# Patient Record
Sex: FEMALE | Race: ASIAN | NOT HISPANIC OR LATINO | Employment: FULL TIME | URBAN - METROPOLITAN AREA
[De-identification: names, ages, dates, MRNs, and addresses within clinical notes are randomized per-mention and may not be internally consistent; named-entity substitution may affect disease eponyms.]

---

## 2017-06-05 ENCOUNTER — OFFICE VISIT (OUTPATIENT)
Dept: INTERNAL MEDICINE | Facility: MEDICAL CENTER | Age: 32
End: 2017-06-05
Payer: MEDICAID

## 2017-06-05 VITALS
DIASTOLIC BLOOD PRESSURE: 70 MMHG | WEIGHT: 110 LBS | HEART RATE: 58 BPM | TEMPERATURE: 97 F | SYSTOLIC BLOOD PRESSURE: 108 MMHG | OXYGEN SATURATION: 99 % | HEIGHT: 60 IN | BODY MASS INDEX: 21.6 KG/M2 | RESPIRATION RATE: 18 BRPM

## 2017-06-05 DIAGNOSIS — R06.9 BREATHING PROBLEM: ICD-10-CM

## 2017-06-05 DIAGNOSIS — K21.9 GASTROESOPHAGEAL REFLUX DISEASE WITHOUT ESOPHAGITIS: ICD-10-CM

## 2017-06-05 DIAGNOSIS — D64.9 ANEMIA, UNSPECIFIED TYPE: ICD-10-CM

## 2017-06-05 DIAGNOSIS — E87.6 HYPOKALEMIA: ICD-10-CM

## 2017-06-05 DIAGNOSIS — Z29.9 PREVENTIVE MEASURE: ICD-10-CM

## 2017-06-05 PROCEDURE — 99203 OFFICE O/P NEW LOW 30 MIN: CPT | Mod: GC | Performed by: INTERNAL MEDICINE

## 2017-06-05 RX ORDER — ALBUTEROL SULFATE 90 UG/1
2 AEROSOL, METERED RESPIRATORY (INHALATION) EVERY 6 HOURS PRN
Qty: 8.5 G | Refills: 1 | Status: SHIPPED | OUTPATIENT
Start: 2017-06-05 | End: 2017-12-07

## 2017-06-05 ASSESSMENT — ENCOUNTER SYMPTOMS
EYE DISCHARGE: 0
WEIGHT LOSS: 0
TINGLING: 0
CHILLS: 0
EYE PAIN: 0
WHEEZING: 0
SHORTNESS OF BREATH: 0
BLURRED VISION: 0
TREMORS: 0
COUGH: 0
SPUTUM PRODUCTION: 0
PALPITATIONS: 0
FEVER: 0
SORE THROAT: 0
VOMITING: 0
ORTHOPNEA: 0
DEPRESSION: 0
ABDOMINAL PAIN: 0
NAUSEA: 0
DIARRHEA: 0
HEADACHES: 0
DIZZINESS: 0

## 2017-06-05 ASSESSMENT — LIFESTYLE VARIABLES: SUBSTANCE_ABUSE: 0

## 2017-06-05 ASSESSMENT — PAIN SCALES - GENERAL: PAINLEVEL: NO PAIN

## 2017-06-05 ASSESSMENT — PATIENT HEALTH QUESTIONNAIRE - PHQ9: CLINICAL INTERPRETATION OF PHQ2 SCORE: 0

## 2017-06-05 NOTE — PROGRESS NOTES
New Patient to Establish    Reason to establish: Difficulty  to take deep breath  For 5 week     CC:Difficult to take deep breath     HPI:   Pt came to the clinic to establish with us.    Pt states that she is having difficulty to take deep breath x 5 week. Never had this problem before. Denied dyspnea, pleuritic chest pain, fever, chills, cough, sore throat, nausea or vomiting. She does not smoke. Patient stated it usually get worse when she exercises.    Patient stated that she had a tubal ligation on 10/ 2016 , stated after the  procedure she had complication ( Bleed and abdominal pain ) so she was admitte to the hospital .    Patient had lab in 10 2016, lab showed normocytic anemia and hypokalemia .    Patient stated that she was diagnosed with GERD in past and was using medication for it. Patient is currently off from medication because her symptoms is well controlled without any medication. Patient stated she had an EGD done in the past due to the GERD and it came back normal .      Patient Active Problem List    Diagnosis Date Noted   • Nausea and vomiting 10/20/2016   • Abdominal pain 10/20/2016   • Encounter for tubal ligation 10/19/2016       Past Medical History   Diagnosis Date   • Anesthesia      postop n/v   • Acid reflux    • Cold      10-       No current outpatient prescriptions on file.     No current facility-administered medications for this visit.       Allergies as of 06/05/2017 - Ben as Reviewed 06/05/2017   Allergen Reaction Noted   • Crab Anaphylaxis 09/06/2016       Social History     Social History   • Marital Status:      Spouse Name: N/A   • Number of Children: N/A   • Years of Education: N/A     Occupational History   • Not on file.     Social History Main Topics   • Smoking status: Never Smoker    • Smokeless tobacco: Never Used   • Alcohol Use: No   • Drug Use: No   • Sexual Activity: Not on file     Other Topics Concern   • Not on file     Social History Narrative        No family history on file.    Past Surgical History   Procedure Laterality Date   • Endoscopy     • Tubal coagulation laparoscopic bilateral Bilateral 10/19/2016     Procedure: TUBAL COAGULATION LAPAROSCOPIC BILATERAL;  Surgeon: Lucia Clemons M.D.;  Location: SURGERY [facility];  Service:    • Intra uterine device removal  10/19/2016     Procedure: INTRA UTERINE DEVICE REMOVAL;  Surgeon: Lucia Clemons M.D.;  Location: SURGERY [facility];  Service:        ROS: As per HPI. Additional pertinent symptoms as noted below.    Review of Systems   Constitutional: Negative for fever, chills, weight loss and malaise/fatigue.   HENT: Negative for congestion and sore throat.    Eyes: Negative for blurred vision, pain and discharge.   Respiratory: Negative for cough, sputum production, shortness of breath and wheezing.    Cardiovascular: Negative for chest pain, palpitations, orthopnea and leg swelling.   Gastrointestinal: Negative for nausea, vomiting, abdominal pain and diarrhea.   Genitourinary: Negative for dysuria, urgency and frequency.   Skin: Negative for rash.   Neurological: Negative for dizziness, tingling, tremors and headaches.   Psychiatric/Behavioral: Negative for depression and substance abuse.       /70 mmHg  Pulse 58  Temp(Src) 36.1 °C (97 °F)  Resp 18  Ht 1.524 m (5')  Wt 49.896 kg (110 lb)  BMI 21.48 kg/m2  SpO2 99%  Breastfeeding? No    Physical Exam  General:  Alert and oriented, No apparent distress.    Eyes:. No scleral icterus.    Throat: Clear no erythema or exudates noted.    Neck: Supple.     Lungs: Clear to auscultation and percussion bilaterally.    Cardiovascular: Regular rate and rhythm. No murmurs, rubs or gallops.    Abdomen:  Benign. No rebound or guarding noted.    Extremities: No clubbing, cyanosis, edema.    Skin: Clear. No rash or suspicious skin lesions noted.      Assessment and Plan    1. Anemia, unspecified type  -H/H in 10/16 : 9.5/28.7 , MCV: 88. Patient states  that she was admitted to the hospital due to the complication of a tubal ligation that lead to the bleeding and abdominal pain.   -Currently denies any symptom of anemia.   -We'll repeat labs if anemia persists then we'll do a full workup of anemia.    2. Hypokalemia  -Lab in 10/16 showed potassium was 3.3  -We'll repeat CMP    3. Preventive measure  -Per patient she got a flu shot in 2016  -Per patient she got Pap smear in 2016 and was normal  -We'll check lipid panel      4. Difficulty to take deep breath  -Denied dyspnea, chest pain, cough, pleuritic chest pain, fever or chills  -Lung examination is normal, so holding x-ray of chest  -We'll start on albuterol as needed. If symptoms persist then we'll consider PFT or methacholine challenge test to rule out asthma.    5.GERD  -Denied any alarm symptoms on this visit  -Not on any medication  -Advised not to eat spicy food, chocolate or caffeine        Signed by: Jennifer Romano M.D.

## 2017-06-05 NOTE — MR AVS SNAPSHOT
Brannon Williamson   2017 9:30 AM   Office Visit   MRN: [id number]    Department:  [facility]   Dept Phone:  [phone]    Description:  Female : 1985   Provider:  Jennifer Romano M.D.           Reason for Visit     Breathing Problem times one month and 2 weeks      Allergies as of 2017     Allergen Noted Reactions    Crab 2016   Anaphylaxis      You were diagnosed with     Anemia, unspecified type   [7361206]       Hypokalemia   [[id number]]       Preventive measure   [354257]         Vital Signs     Blood Pressure Pulse Temperature Respirations Height Weight    108/70 mmHg 58 36.1 °C (97 °F) 18 1.524 m (5') 49.896 kg (110 lb)    Body Mass Index Oxygen Saturation Breastfeeding? Smoking Status          21.48 kg/m2 99% No Never Smoker         Basic Information     Date Of Birth Sex Race Ethnicity Preferred Language    1985 Female  Non- English      Problem List              ICD-10-CM Priority Class Noted - Resolved    Encounter for tubal ligation Z30.2   10/19/2016 - Present    Nausea and vomiting R11.2   10/20/2016 - Present    Abdominal pain R10.9   10/20/2016 - Present      Health Maintenance        Date Due Completion Dates    IMM DTaP/Tdap/Td Vaccine (1 - Tdap) 2004 ---    PAP SMEAR 2006 ---            Current Immunizations     Influenza Vaccine Quad Inj (Pf) 10/20/2016  8:40 PM    Tdap Vaccine 2010      Below and/or attached are the medications your provider expects you to take. Review all of your home medications and newly ordered medications with your provider and/or pharmacist. Follow medication instructions as directed by your provider and/or pharmacist. Please keep your medication list with you and share with your provider. Update the information when medications are discontinued, doses are changed, or new medications (including over-the-counter products) are added; and carry medication information at all times in the event of  emergency situations     Allergies:  CRAB - Anaphylaxis               Medications  Valid as of: June 05, 2017 -  9:59 AM    Generic Name Brand Name Tablet Size Instructions for use    .                 Medicines prescribed today were sent to:     [facility] [address] [place] [address] [place] - [address] [place] [address]    Phone: [phone] Fax: [phone]    Open 24 Hours?: No      Medication refill instructions:       If your prescription bottle indicates you have medication refills left, it is not necessary to call your provider’s office. Please contact your pharmacy and they will refill your medication.    If your prescription bottle indicates you do not have any refills left, you may request refills at any time through one of the following ways: The online [employer] system (except Urgent Care), by calling your provider’s office, or by asking your pharmacy to contact your provider’s office with a refill request. Medication refills are processed only during regular business hours and may not be available until the next business day. Your provider may request additional information or to have a follow-up visit with you prior to refilling your medication.   *Please Note: Medication refills are assigned a new Rx number when refilled electronically. Your pharmacy may indicate that no refills were authorized even though a new prescription for the same medication is available at the pharmacy. Please request the medicine by name with the pharmacy before contacting your provider for a refill.        Your To Do List     Future Labs/Procedures Complete By Expires    CBC WITH DIFFERENTIAL  As directed 6/5/2018    COMP METABOLIC PANEL  As directed 6/5/2018         [employer] Access Code: Activation code not generated  Current [employer] Status: Active

## 2017-07-17 DIAGNOSIS — E87.6 HYPOKALEMIA: ICD-10-CM

## 2017-07-17 DIAGNOSIS — D64.9 ANEMIA, UNSPECIFIED TYPE: ICD-10-CM

## 2017-12-07 ENCOUNTER — OFFICE VISIT (OUTPATIENT)
Dept: INTERNAL MEDICINE | Facility: MEDICAL CENTER | Age: 32
End: 2017-12-07
Payer: COMMERCIAL

## 2017-12-07 VITALS
HEIGHT: 61 IN | WEIGHT: 113.8 LBS | HEART RATE: 70 BPM | OXYGEN SATURATION: 99 % | SYSTOLIC BLOOD PRESSURE: 102 MMHG | TEMPERATURE: 97.5 F | BODY MASS INDEX: 21.49 KG/M2 | DIASTOLIC BLOOD PRESSURE: 78 MMHG

## 2017-12-07 DIAGNOSIS — R06.02 SHORTNESS OF BREATH: ICD-10-CM

## 2017-12-07 DIAGNOSIS — Z00.00 HEALTH CARE MAINTENANCE: ICD-10-CM

## 2017-12-07 DIAGNOSIS — K21.9 GASTROESOPHAGEAL REFLUX DISEASE WITHOUT ESOPHAGITIS: ICD-10-CM

## 2017-12-07 DIAGNOSIS — J02.9 SORE THROAT: ICD-10-CM

## 2017-12-07 LAB
INT CON NEG: NEGATIVE
INT CON POS: POSITIVE
S PYO AG THROAT QL: NORMAL

## 2017-12-07 PROCEDURE — 99214 OFFICE O/P EST MOD 30 MIN: CPT | Mod: GC | Performed by: INTERNAL MEDICINE

## 2017-12-07 PROCEDURE — 87880 STREP A ASSAY W/OPTIC: CPT | Performed by: INTERNAL MEDICINE

## 2017-12-07 RX ORDER — OMEPRAZOLE 20 MG/1
20 CAPSULE, DELAYED RELEASE ORAL DAILY
Qty: 60 CAP | Refills: 0 | Status: SHIPPED | OUTPATIENT
Start: 2017-12-07 | End: 2021-03-04

## 2017-12-07 RX ORDER — FAMOTIDINE 40 MG/1
40 TABLET, FILM COATED ORAL DAILY
Qty: 60 TAB | Refills: 0 | Status: SHIPPED | OUTPATIENT
Start: 2017-12-07 | End: 2021-03-04

## 2017-12-07 NOTE — PATIENT INSTRUCTIONS
Start taking omeprazole in the morning and pepcid at night.   Follow up with GI doctor.       Gastroesophageal Reflux Disease, Adult  Gastroesophageal reflux disease (GERD) happens when acid from your stomach flows up into the esophagus. When acid comes in contact with the esophagus, the acid causes soreness (inflammation) in the esophagus. Over time, GERD may create small holes (ulcers) in the lining of the esophagus.  CAUSES   · Increased body weight. This puts pressure on the stomach, making acid rise from the stomach into the esophagus.  · Smoking. This increases acid production in the stomach.  · Drinking alcohol. This causes decreased pressure in the lower esophageal sphincter (valve or ring of muscle between the esophagus and stomach), allowing acid from the stomach into the esophagus.  · Late evening meals and a full stomach. This increases pressure and acid production in the stomach.  · A malformed lower esophageal sphincter.  Sometimes, no cause is found.  SYMPTOMS   · Burning pain in the lower part of the mid-chest behind the breastbone and in the mid-stomach area. This may occur twice a week or more often.  · Trouble swallowing.  · Sore throat.  · Dry cough.  · Asthma-like symptoms including chest tightness, shortness of breath, or wheezing.  DIAGNOSIS   Your caregiver may be able to diagnose GERD based on your symptoms. In some cases, X-rays and other tests may be done to check for complications or to check the condition of your stomach and esophagus.  TREATMENT   Your caregiver may recommend over-the-counter or prescription medicines to help decrease acid production. Ask your caregiver before starting or adding any new medicines.   HOME CARE INSTRUCTIONS   · Change the factors that you can control. Ask your caregiver for guidance concerning weight loss, quitting smoking, and alcohol consumption.  · Avoid foods and drinks that make your symptoms worse, such as:  ¨ Caffeine or alcoholic  drinks.  ¨ Chocolate.  ¨ Peppermint or mint flavorings.  ¨ Garlic and onions.  ¨ Spicy foods.  ¨ Citrus fruits, such as oranges, ghulam, or limes.  ¨ Tomato-based foods such as sauce, chili, salsa, and pizza.  ¨ Fried and fatty foods.  · Avoid lying down for the 3 hours prior to your bedtime or prior to taking a nap.  · Eat small, frequent meals instead of large meals.  · Wear loose-fitting clothing. Do not wear anything tight around your waist that causes pressure on your stomach.  · Raise the head of your bed 6 to 8 inches with wood blocks to help you sleep. Extra pillows will not help.  · Only take over-the-counter or prescription medicines for pain, discomfort, or fever as directed by your caregiver.  · Do not take aspirin, ibuprofen, or other nonsteroidal anti-inflammatory drugs (NSAIDs).  SEEK IMMEDIATE MEDICAL CARE IF:   · You have pain in your arms, neck, jaw, teeth, or back.  · Your pain increases or changes in intensity or duration.  · You develop nausea, vomiting, or sweating (diaphoresis).  · You develop shortness of breath, or you faint.  · Your vomit is green, yellow, black, or looks like coffee grounds or blood.  · Your stool is red, bloody, or black.  These symptoms could be signs of other problems, such as heart disease, gastric bleeding, or esophageal bleeding.  MAKE SURE YOU:   · Understand these instructions.  · Will watch your condition.  · Will get help right away if you are not doing well or get worse.     This information is not intended to replace advice given to you by your health care provider. Make sure you discuss any questions you have with your health care provider.     Document Released: 09/27/2006 Document Revised: 01/08/2016 Document Reviewed: 04/13/2016  [employer] Interactive Patient Education ©2016 [employer]

## 2017-12-07 NOTE — PROGRESS NOTES
"      Established Patient    Brannon presents today with the following:    CC: epigastric pain     HPI: Ms Williamson is a 33 yo female presents today for epigastric pain started last Sunday, it is burning pain,worsens intermittently, specially with food with no radiation. She has a history of GERD, was on omeprazole until few months ago. She stopped taking it few months ago as her symptoms got better. She had 2 endoscopies done, last was about one year ago which was normal. She has an appointment with GI on January 12th. She avoids spicy food, chocolate because it worsens reflux. She has sore throat, no cold, fever, cough. She also has the sensation of unable to take a deep breath. Albuterol inhaler did not help. She denies loss of appetite, weight loss, melena, hematemesis.   She also complains of sensation of difficult to take a deep breath specially when exercising. Denies chest pain, cough, wheezing, night time waking with SOB. Previously prescribed albuterol did not help and she stopped using it.     Patient Active Problem List    Diagnosis Date Noted   • Breathing problem 06/05/2017   • Preventive measure 06/05/2017   • Hypokalemia 06/05/2017   • Anemia 06/05/2017   • Nausea and vomiting 10/20/2016   • Abdominal pain 10/20/2016   • Encounter for tubal ligation 10/19/2016       Current Outpatient Prescriptions   Medication Sig Dispense Refill   • famotidine (PEPCID) 40 MG Tab Take 1 Tab by mouth every day. At night 60 Tab 0   • omeprazole (PRILOSEC) 20 MG delayed-release capsule Take 1 Cap by mouth every day. In the morning 60 Cap 0     No current facility-administered medications for this visit.        ROS: As per HPI. Additional pertinent symptoms as noted below.        /78   Pulse 70   Temp 36.4 °C (97.5 °F)   Ht 1.543 m (5' 0.75\")   Wt 51.6 kg (113 lb 12.8 oz)   SpO2 99%   BMI 21.68 kg/m²     Physical Exam   Constitutional:  oriented to person, place, and time. No distress.   Eyes: Pupils are equal, " round, and reactive to light. No scleral icterus.  Neck: Neck supple. No thyromegaly present.   Cardiovascular: Normal rate, regular rhythm and normal heart sounds.  Exam reveals no gallop and no friction rub.  No murmur heard.  Pulmonary/Chest: Breath sounds normal. Chest wall is not dull to percussion.   Musculoskeletal:   no edema.   Lymphadenopathy: no cervical adenopathy  Neurological: alert and oriented to person, place, and time.   Skin: No cyanosis. Nails show no clubbing.  Throat: erythema,no exudates, no cervical LN     Note: I have reviewed all pertinent labs and diagnostic tests associated with this visit with specific comments listed under the assessment and plan below    Assessment and Plan  1. Gastroesophageal reflux disease without esophagitis  Recommended to take pepcid and omeprazole.   Avoid food that trigger GERD.   Follow up with GI     2. Sore throat  rapid Strep A - negative   Symptomatic treatment, can be due to reflux.     3. Health care maintenance  Flu- received  Pap smear - last done 2016  Reviewed and discussed labs from 07/2017 HB 13.7, normal FBS, lipid panel     4. Difficulty to take deep breath  -Denied dyspnea, chest pain, cough, pleuritic chest pain, fever or chills  Spirometry in office showed FEV1/FVC 0.9   Reassured the patient.    Followup: Return in about 10 weeks (around 2/15/2018).      Signed by: Yonas Reece M.D.

## 2017-12-12 DIAGNOSIS — R06.02 SHORTNESS OF BREATH: ICD-10-CM

## 2018-11-19 ENCOUNTER — OFFICE VISIT (OUTPATIENT)
Dept: INTERNAL MEDICINE | Facility: MEDICAL CENTER | Age: 33
End: 2018-11-19
Payer: COMMERCIAL

## 2018-11-19 VITALS
SYSTOLIC BLOOD PRESSURE: 111 MMHG | HEIGHT: 61 IN | BODY MASS INDEX: 22.96 KG/M2 | OXYGEN SATURATION: 99 % | HEART RATE: 64 BPM | DIASTOLIC BLOOD PRESSURE: 72 MMHG | TEMPERATURE: 98 F | WEIGHT: 121.6 LBS

## 2018-11-19 DIAGNOSIS — R06.9 BREATHING PROBLEM: ICD-10-CM

## 2018-11-19 DIAGNOSIS — Z23 ENCOUNTER FOR VACCINATION: ICD-10-CM

## 2018-11-19 DIAGNOSIS — L20.84 INTRINSIC ECZEMA: ICD-10-CM

## 2018-11-19 DIAGNOSIS — Z00.00 ANNUAL PHYSICAL EXAM: ICD-10-CM

## 2018-11-19 DIAGNOSIS — L60.0 INGROWN TOENAIL: ICD-10-CM

## 2018-11-19 DIAGNOSIS — K21.9 GASTROESOPHAGEAL REFLUX DISEASE WITHOUT ESOPHAGITIS: ICD-10-CM

## 2018-11-19 PROCEDURE — 90471 IMMUNIZATION ADMIN: CPT | Performed by: INTERNAL MEDICINE

## 2018-11-19 PROCEDURE — 90686 IIV4 VACC NO PRSV 0.5 ML IM: CPT | Performed by: INTERNAL MEDICINE

## 2018-11-19 PROCEDURE — 99395 PREV VISIT EST AGE 18-39: CPT | Mod: GC,25 | Performed by: INTERNAL MEDICINE

## 2018-11-19 RX ORDER — CLOBETASOL PROPIONATE 0.5 MG/G
OINTMENT TOPICAL
Qty: 1 TUBE | Refills: 0 | Status: SHIPPED | OUTPATIENT
Start: 2018-11-19 | End: 2021-03-04

## 2018-11-19 ASSESSMENT — PATIENT HEALTH QUESTIONNAIRE - PHQ9: CLINICAL INTERPRETATION OF PHQ2 SCORE: 0

## 2018-11-19 NOTE — PROGRESS NOTES
Established Patient    Brannon presents today with the following:    CC: annual exam, ingrown toe nail     HPI: Ms Williamson is a 31 yo female here today for annual visit.   She continues to have the sensation of needing to take a deep breath but unable to do it. It happens almost daily mostly when she is resting, denies shortness of breath on exertion, cough, pleuritic type chest pain.  She is able to exercise regularly without any issues.  It does not interfere her daily life much.   She takes PPI as needed basis for GERD, symptoms under control, has modified her diet which helps.  She has bilateral ingrown toenails on both big toes.  It irritates especially during the night, wakes her up from sleep.  requesting a referral to podiatry.  She has eczema since teenage years, flares up on and off, previously used clobetasol ointment and tacrolimus cream.   Non-smoker, no alcohol use.  Sexually active, .  had tubal ligation done.   Pap smear scheduled for January.       Patient Active Problem List    Diagnosis Date Noted   • Annual physical exam 11/19/2018   • Ingrown toenail 11/19/2018   • Gastroesophageal reflux disease without esophagitis 12/07/2017   • Health care maintenance 12/07/2017   • Breathing problem 06/05/2017   • Preventive measure 06/05/2017   • Hypokalemia 06/05/2017   • Anemia 06/05/2017   • Nausea and vomiting 10/20/2016   • Abdominal pain 10/20/2016   • Encounter for tubal ligation 10/19/2016       Current Outpatient Prescriptions   Medication Sig Dispense Refill   • clobetasol (TEMOVATE) 0.05 % Ointment Apply as a thin layer to affected area once daily 1 Tube 0   • famotidine (PEPCID) 40 MG Tab Take 1 Tab by mouth every day. At night 60 Tab 0   • omeprazole (PRILOSEC) 20 MG delayed-release capsule Take 1 Cap by mouth every day. In the morning 60 Cap 0     No current facility-administered medications for this visit.        ROS: As per HPI. Additional pertinent systems as noted below.  BP  "111/72 (BP Location: Right arm, Patient Position: Sitting)   Pulse 64   Temp 36.7 °C (98 °F) (Temporal)   Ht 1.543 m (5' 0.75\")   Wt 55.2 kg (121 lb 9.6 oz)   SpO2 99%   BMI 23.17 kg/m²     Physical Exam   Constitutional:  oriented to person, place, and time. No distress.   Eyes: Pupils are equal, round, and reactive to light. No scleral icterus.  Neck: Neck supple. No thyromegaly present.   Cardiovascular: Normal rate, regular rhythm and normal heart sounds.  Exam reveals no gallop and no friction rub.  No murmur heard.  Pulmonary/Chest: Breath sounds normal. Chest wall is not dull to percussion.   Musculoskeletal:   no edema.   Lymphadenopathy: no cervical adenopathy  Neurological: alert and oriented to person, place, and time.   Skin: No cyanosis. Nails show no clubbing.  Ingrown toenail of bilateral big toes.     Note: I have reviewed all pertinent labs and diagnostic tests associated with this visit with specific comments listed under the assessment and plan below    Assessment and Plan    1. Breathing problem  Previously had spirometry done in the office which was normal.   Discussed at length that it is normal to feel the need to take a deep breath on and off especially when you are distracted/poor posture in the body is trying to expand the lungs.  This does not limit her activities and happens infrequently.   There are no other symptoms to suggest to do pulmonary function test.   Reassured and will continue to monitor.  Patient verbalized understanding.    2. Gastroesophageal reflux disease without esophagitis  Continue PPI    3. Annual physical exam  We will get basic lab work.  CBC CMP.  Had lipid panel done last year.    4. Encounter for vaccination  Flu shot given today.     5. Ingrown toenail  Discussed avoiding tight shoes, then the nails grow a little longer.   Referred to podiatry.       Followup: Return in about 1 year (around 11/19/2019).      Signed by: Yonas Reece M.D.  "

## 2018-11-19 NOTE — NON-PROVIDER
"Brannon Williamson is a 32 y.o. female here for a non-provider visit for:   FLU    Reason for immunization: Annual Flu Vaccine  Immunization records indicate need for vaccine: Yes, confirmed with Epic  Minimum interval has been met for this vaccine: Yes  ABN completed: Not Indicated    Order and dose verified by: Dionne RUBY Dated  08/07/15 was given to patient: Yes  All IAC Questionnaire questions were answered \"No.\"    Patient tolerated injection and no adverse effects were observed or reported: Yes    Pt scheduled for next dose in series: Not Indicated  "

## 2018-11-19 NOTE — PROGRESS NOTES
Established Patient    Brannon presents today with the following:    CC: ***    HPI: ***    Patient Active Problem List    Diagnosis Date Noted   • Gastroesophageal reflux disease without esophagitis 12/07/2017   • Health care maintenance 12/07/2017   • Breathing problem 06/05/2017   • Preventive measure 06/05/2017   • Hypokalemia 06/05/2017   • Anemia 06/05/2017   • Nausea and vomiting 10/20/2016   • Abdominal pain 10/20/2016   • Encounter for tubal ligation 10/19/2016       Current Outpatient Prescriptions   Medication Sig Dispense Refill   • famotidine (PEPCID) 40 MG Tab Take 1 Tab by mouth every day. At night 60 Tab 0   • omeprazole (PRILOSEC) 20 MG delayed-release capsule Take 1 Cap by mouth every day. In the morning 60 Cap 0     No current facility-administered medications for this visit.        ROS: As per HPI. Additional pertinent systems as noted below.    {ROS List:[id number]}    There were no vitals taken for this visit.           Physical Exam   Constitutional:  oriented to person, place, and time. No distress.   Eyes: Pupils are equal, round, and reactive to light. No scleral icterus.  Neck: Neck supple. No thyromegaly present.   Cardiovascular: Normal rate, regular rhythm and normal heart sounds.  Exam reveals no gallop and no friction rub.  No murmur heard.  Pulmonary/Chest: Breath sounds normal. Chest wall is not dull to percussion.   Musculoskeletal:   no edema.   Lymphadenopathy: no cervical adenopathy  Neurological: alert and oriented to person, place, and time.   Skin: No cyanosis. Nails show no clubbing.  Other: ***    Note: I have reviewed all pertinent labs and diagnostic tests associated with this visit with specific comments listed under the assessment and plan below    Assessment and Plan    There are no diagnoses linked to this encounter.    Followup: No Follow-up on file.      Signed by: Yonas Reece M.D.

## 2019-04-02 ENCOUNTER — HOSPITAL ENCOUNTER (OUTPATIENT)
Dept: LAB | Facility: MEDICAL CENTER | Age: 34
End: 2019-04-02
Attending: INTERNAL MEDICINE
Payer: COMMERCIAL

## 2019-04-02 DIAGNOSIS — Z00.00 ANNUAL PHYSICAL EXAM: ICD-10-CM

## 2019-04-02 LAB
ALBUMIN SERPL BCP-MCNC: 3.9 G/DL (ref 3.2–4.9)
ALBUMIN/GLOB SERPL: 1.3 G/DL
ALP SERPL-CCNC: 69 U/L (ref 30–99)
ALT SERPL-CCNC: 19 U/L (ref 2–50)
ANION GAP SERPL CALC-SCNC: 8 MMOL/L (ref 0–11.9)
AST SERPL-CCNC: 21 U/L (ref 12–45)
BASOPHILS # BLD AUTO: 0.3 % (ref 0–1.8)
BASOPHILS # BLD: 0.02 K/UL (ref 0–0.12)
BILIRUB SERPL-MCNC: 0.6 MG/DL (ref 0.1–1.5)
BUN SERPL-MCNC: 11 MG/DL (ref 8–22)
CALCIUM SERPL-MCNC: 8.4 MG/DL (ref 8.5–10.5)
CHLORIDE SERPL-SCNC: 104 MMOL/L (ref 96–112)
CO2 SERPL-SCNC: 27 MMOL/L (ref 20–33)
CREAT SERPL-MCNC: 0.72 MG/DL (ref 0.5–1.4)
EOSINOPHIL # BLD AUTO: 0.17 K/UL (ref 0–0.51)
EOSINOPHIL NFR BLD: 2.4 % (ref 0–6.9)
ERYTHROCYTE [DISTWIDTH] IN BLOOD BY AUTOMATED COUNT: 40.9 FL (ref 35.9–50)
GLOBULIN SER CALC-MCNC: 3 G/DL (ref 1.9–3.5)
GLUCOSE SERPL-MCNC: 89 MG/DL (ref 65–99)
HCT VFR BLD AUTO: 42.6 % (ref 37–47)
HGB BLD-MCNC: 13.8 G/DL (ref 12–16)
IMM GRANULOCYTES # BLD AUTO: 0.02 K/UL (ref 0–0.11)
IMM GRANULOCYTES NFR BLD AUTO: 0.3 % (ref 0–0.9)
LYMPHOCYTES # BLD AUTO: 1.03 K/UL (ref 1–4.8)
LYMPHOCYTES NFR BLD: 14.7 % (ref 22–41)
MCH RBC QN AUTO: 29.4 PG (ref 27–33)
MCHC RBC AUTO-ENTMCNC: 32.4 G/DL (ref 33.6–35)
MCV RBC AUTO: 90.8 FL (ref 81.4–97.8)
MONOCYTES # BLD AUTO: 0.52 K/UL (ref 0–0.85)
MONOCYTES NFR BLD AUTO: 7.4 % (ref 0–13.4)
NEUTROPHILS # BLD AUTO: 5.26 K/UL (ref 2–7.15)
NEUTROPHILS NFR BLD: 74.9 % (ref 44–72)
NRBC # BLD AUTO: 0 K/UL
NRBC BLD-RTO: 0 /100 WBC
PLATELET # BLD AUTO: 212 K/UL (ref 164–446)
PMV BLD AUTO: 9.8 FL (ref 9–12.9)
POTASSIUM SERPL-SCNC: 3.3 MMOL/L (ref 3.6–5.5)
PROT SERPL-MCNC: 6.9 G/DL (ref 6–8.2)
RBC # BLD AUTO: 4.69 M/UL (ref 4.2–5.4)
SODIUM SERPL-SCNC: 139 MMOL/L (ref 135–145)
WBC # BLD AUTO: 7 K/UL (ref 4.8–10.8)

## 2019-04-02 PROCEDURE — 80053 COMPREHEN METABOLIC PANEL: CPT

## 2019-04-02 PROCEDURE — 85025 COMPLETE CBC W/AUTO DIFF WBC: CPT

## 2019-04-02 PROCEDURE — 36415 COLL VENOUS BLD VENIPUNCTURE: CPT

## 2021-02-28 ENCOUNTER — OFFICE VISIT (OUTPATIENT)
Dept: URGENT CARE | Facility: PHYSICIAN GROUP | Age: 36
End: 2021-02-28
Payer: COMMERCIAL

## 2021-02-28 ENCOUNTER — APPOINTMENT (OUTPATIENT)
Dept: RADIOLOGY | Facility: IMAGING CENTER | Age: 36
End: 2021-02-28
Attending: NURSE PRACTITIONER
Payer: COMMERCIAL

## 2021-02-28 VITALS
SYSTOLIC BLOOD PRESSURE: 110 MMHG | WEIGHT: 117 LBS | TEMPERATURE: 98.5 F | HEIGHT: 60 IN | RESPIRATION RATE: 16 BRPM | BODY MASS INDEX: 22.97 KG/M2 | HEART RATE: 65 BPM | OXYGEN SATURATION: 99 % | DIASTOLIC BLOOD PRESSURE: 70 MMHG

## 2021-02-28 DIAGNOSIS — M25.561 ACUTE PAIN OF RIGHT KNEE: ICD-10-CM

## 2021-02-28 DIAGNOSIS — S89.91XA INJURY OF RIGHT KNEE, INITIAL ENCOUNTER: ICD-10-CM

## 2021-02-28 DIAGNOSIS — S86.911A KNEE STRAIN, RIGHT, INITIAL ENCOUNTER: ICD-10-CM

## 2021-02-28 PROCEDURE — 73564 X-RAY EXAM KNEE 4 OR MORE: CPT | Mod: TC,RT | Performed by: NURSE PRACTITIONER

## 2021-02-28 PROCEDURE — 99203 OFFICE O/P NEW LOW 30 MIN: CPT | Performed by: NURSE PRACTITIONER

## 2021-02-28 ASSESSMENT — VISUAL ACUITY: OU: 1

## 2021-02-28 ASSESSMENT — FIBROSIS 4 INDEX: FIB4 SCORE: 0.8

## 2021-02-28 ASSESSMENT — ENCOUNTER SYMPTOMS
CONSTITUTIONAL NEGATIVE: 1
NEUROLOGICAL NEGATIVE: 1

## 2021-02-28 NOTE — PATIENT INSTRUCTIONS
A referral request has been placed for sports medicine. We have a referrals department that is tasked with locating a suitable office that currently accepts patients and your insurance and you should be receiving referral information from them such as the office name, address, and number. This process usually takes around 3 business days. If you are not contacted by our referrals department, please call [phone].         Knee Sprain, Adult    A knee sprain is a stretch or tear in a knee ligament. Knee ligaments are bands of tissue that connect bones in the knee to each other.  What are the causes?  This condition often results from:  · A fall.  · An injury to the knee.  What are the signs or symptoms?  Symptoms of this condition include:  · Trouble bending the leg.  · Swelling in the knee.  · Bruising around the knee.  · Tenderness or pain in the knee.  · Muscle spasms around the knee.  How is this diagnosed?  This condition may be diagnosed based on:  · A physical exam.  · What happened just before you started to have symptoms.  · Tests, including:  ? An X-ray. This may be done to make sure no bones are broken.  ? An MRI. This may be done to check if the ligament is torn.  ? Stress testing of the knee. This may be done to check ligament damage.  How is this treated?  Treatment for this condition may involve:  · Keeping the knee still (immobilized) with a cast, brace, or splint.  · Applying ice to the knee. This helps with pain and swelling.  · Keeping the knee raised (elevated) above the level of your heart when you are resting. This helps with pain and swelling.  · Taking medicine for pain.  · Exercises to prevent or limit permanent weakness or stiffness in your knee.  · Surgery to reconnect the ligament to the bone or to reconstruct it. This may be needed if the ligament tore all the way.  Follow these instructions at home:  If you have a splint or brace:  · Wear the splint or brace as told by your health  care provider. Remove it only as told by your health care provider.  · Loosen the splint or brace if your toes tingle, become numb, or turn cold and blue.  · Keep the splint or brace clean.  · If the splint or brace is not waterproof:  ? Do not let it get wet.  ? Cover it with a watertight covering when you take a bath or a shower.  If you have a cast:  · Do not stick anything inside the cast to scratch your skin. Doing that increases your risk of infection.  · Check the skin around the cast every day. Tell your health care provider about any concerns.  · You may put lotion on dry skin around the edges of the cast. Do not put lotion on the skin underneath the cast.  · Keep the cast clean.  · If the cast is not waterproof:  ? Do not let it get wet.  ? Cover it with a watertight covering when you take a bath or a shower.  Managing pain, stiffness, and swelling    · If directed, put ice on the injured area.  ? If you have a removable splint or brace, remove it as told by your health care provider.  ? Put ice in a plastic bag.  ? Place a towel between your skin and the bag or between your cast and the bag.  ? Leave the ice on for 20 minutes, 2-3 times a day.  · Gently move your toes often to avoid stiffness and to lessen swelling.  · Elevate the injured area above the level of your heart while you are sitting or lying down.  · Take over-the-counter and prescription medicines only as told by your health care provider.  General instructions  · Do exercises as told by your health care provider.  · Keep all follow-up visits as told by your health care provider. This is important.  Contact a health care provider if:  · You have pain that gets worse.  · The cast, brace, or splint does not fit right.  · The cast, brace, or splint gets damaged.  Get help right away if:  · You cannot use your injured joint to support any of your body weight (cannot bear weight).  · You cannot move the injured joint.  · You cannot walk more than  a few steps without pain or without your knee buckling.  · You have significant pain, swelling, or numbness below the cast, brace, or splint.  This information is not intended to replace advice given to you by your health care provider. Make sure you discuss any questions you have with your health care provider.  Document Released: 12/18/2006 Document Revised: 04/10/2020 Document Reviewed: 07/07/2017  Elsevier Patient Education © 2020 [employer]        Meniscus Tear    A meniscus tear is a knee injury that happens when a piece of the meniscus is torn. The meniscus is a thick, rubbery, wedge-shaped cartilage in the knee. Two menisci are located in each knee. They sit between the upper bone (femur) and lower bone (tibia) that make up the knee joint. Each meniscus acts as a shock absorber for the knee.  A torn meniscus is one of the most common types of knee injuries. This injury can range from mild to severe. Surgery may be needed to repair a severe tear.  What are the causes?  This condition may be caused by any kneeling, squatting, twisting, or pivoting movement. Sports-related injuries are the most common cause. These often occur from:  · Running and stopping suddenly.  ? Changing direction.  ? Being tackled or knocked off your feet.  · Lifting or carrying heavy weights.  As people get older, their menisci get thinner and weaker. In these people, tears can happen more easily, such as from climbing stairs.  What increases the risk?  You are more likely to develop this condition if you:  · Play contact sports.  · Have a job that requires kneeling or squatting.  · Are male.  · Are over 40 years old.  What are the signs or symptoms?  Symptoms of this condition include:  · Knee pain, especially at the side of the knee joint. You may feel pain when the injury occurs, or you may only hear a pop and feel pain later.  · A feeling that your knee is clicking, catching, locking, or giving way.  · Not being able to fully  bend or extend your knee.  · Bruising or swelling in your knee.  How is this diagnosed?  This condition may be diagnosed based on your symptoms and a physical exam.  You may also have tests, such as:  · X-rays.  · MRI.  · A procedure to look inside your knee with a narrow surgical telescope (arthroscopy).  You may be referred to a knee specialist (orthopedic surgeon).  How is this treated?  Treatment for this injury depends on the severity of the tear. Treatment for a mild tear may include:  · Rest.  · Medicine to reduce pain and swelling. This is usually a nonsteroidal anti-inflammatory drug (NSAID), like ibuprofen.  · A knee brace, sleeve, or wrap.  · Using crutches or a walker to keep weight off your knee and to help you walk.  · Exercises to strengthen your knee (physical therapy).  You may need surgery if you have a severe tear or if other treatments are not working.  Follow these instructions at home:  If you have a brace, sleeve, or wrap:  · Wear it as told by your health care provider. Remove it only as told by your health care provider.  · Loosen the brace, sleeve, or wrap if your toes tingle, become numb, or turn cold and blue.  · Keep the brace, sleeve, or wrap clean and dry.  · If the brace, sleeve, or wrap is not waterproof:  ? Do not let it get wet.  ? Cover it with a watertight covering when you take a bath or shower.  Managing pain and swelling    · Take over-the-counter and prescription medicines only as told by your health care provider.  · If directed, put ice on your knee:  ? If you have a removable brace, sleeve, or wrap, remove it as told by your health care provider.  ? Put ice in a plastic bag.  ? Place a towel between your skin and the bag.  ? Leave the ice on for 20 minutes, 2-3 times per day.  · Move your toes often to avoid stiffness and to lessen swelling.  · Raise (elevate) the injured area above the level of your heart while you are sitting or lying down.  Activity  · Do not use the  injured limb to support your body weight until your health care provider says that you can. Use crutches or a walker as told by your health care provider.  · Return to your normal activities as told by your health care provider. Ask your health care provider what activities are safe for you.  · Perform range-of-motion exercises only as told by your health care provider.  · Begin doing exercises to strengthen your knee and leg muscles only as told by your health care provider. After you recover, your health care provider may recommend these exercises to help prevent another injury.  General instructions  · Use a knee brace, sleeve, or wrap as told by your health care provider.  · Ask your health care provider when it is safe to drive if you have a brace, sleeve, or wrap on your knee.  · Do not use any products that contain nicotine or tobacco, such as cigarettes, e-cigarettes, and chewing tobacco. If you need help quitting, ask your health care provider.  · Ask your health care provider if the medicine prescribed to you:  ? Requires you to avoid driving or using heavy machinery.  ? Can cause constipation. You may need to take these actions to prevent or treat constipation:  § Drink enough fluid to keep your urine pale yellow.  § Take over-the-counter or prescription medicines.  § Eat foods that are high in fiber, such as beans, whole grains, and fresh fruits and vegetables.  § Limit foods that are high in fat and processed sugars, such as fried or sweet foods.  · Keep all follow-up visits as told by your health care provider. This is important.  Contact a health care provider if:  · You have a fever.  · Your knee becomes red, tender, or swollen.  · Your pain medicine is not helping.  · Your symptoms get worse or do not improve after 2 weeks of home care.  Summary  · A meniscus tear is a knee injury that happens when a piece of the meniscus is torn.  · Treatment for this injury depends on the severity of the tear. You  may need surgery if you have a severe tear or if other treatments are not working.  · Rest, ice, and raise (elevate) your injured knee as told by your health care provider. This will help lessen pain and swelling.  · Contact a health care provider if you have new symptoms, or your symptoms get worse or do not improve after 2 weeks of home care.  · Keep all follow-up visits as told by your health care provider. This is important.  This information is not intended to replace advice given to you by your health care provider. Make sure you discuss any questions you have with your health care provider.  Document Released: 03/09/2004 Document Revised: 07/02/2019 Document Reviewed: 07/02/2019  Elsevier Patient Education © 2020 Elsevier Inc.

## 2021-02-28 NOTE — PROGRESS NOTES
Subjective:     Brannon Williamson is a 35 y.o. female who presents for Knee Injury (R knee pain, swelling, popped, constant pain, painful to put weight on, x2 days )       Knee Injury  This is a new problem. Episode onset: 2 days ago. The problem has been gradually worsening. Pertinent negatives include no rash. She has tried nothing for the symptoms.     Patient reports that 2 days ago she was doing a high knees workout circling a tire when she felt and heard a pop in her right knee. Felt immediate pain. Getting worse. Has tenderness along the anteromedial aspect of the right knee. Pain worsens with palpation and range of motion. Weight-bearing and ambulation makes the pain worse. Has a difficult time squatting and transitioning to/from sitting due to the pain. Feels like her whole right leg is stiff.    Patient was screened prior to rooming and denied COVID-19 diagnosis or contact with a person who has been diagnosed or is suspected to have COVID-19. During this visit, appropriate PPE was worn, hand hygiene was performed, and the patient and any visitors were masked.     PMH:  has a past medical history of Acid reflux, Anesthesia, and Cold.    MEDS:   Current Outpatient Medications:   •  clobetasol (TEMOVATE) 0.05 % Ointment, Apply as a thin layer to affected area once daily (Patient not taking: Reported on 2/28/2021), Disp: 1 Tube, Rfl: 0  •  famotidine (PEPCID) 40 MG Tab, Take 1 Tab by mouth every day. At night (Patient not taking: Reported on 2/28/2021), Disp: 60 Tab, Rfl: 0  •  omeprazole (PRILOSEC) 20 MG delayed-release capsule, Take 1 Cap by mouth every day. In the morning (Patient not taking: Reported on 2/28/2021), Disp: 60 Cap, Rfl: 0    ALLERGIES:   Allergies   Allergen Reactions   • Crab Anaphylaxis     SURGHX:   Past Surgical History:   Procedure Laterality Date   • TUBAL COAGULATION LAPAROSCOPIC BILATERAL Bilateral 10/19/2016    Procedure: TUBAL COAGULATION LAPAROSCOPIC BILATERAL;  Surgeon: Lucia  DIANA Clemons;  Location: SURGERY [facility];  Service:    • INTRA UTERINE DEVICE REMOVAL  10/19/2016    Procedure: INTRA UTERINE DEVICE REMOVAL;  Surgeon: Lucia Clemons M.D.;  Location: SURGERY [facility];  Service:    • ENDOSCOPY       SOCHX:  reports that she has never smoked. She has never used smokeless tobacco. She reports that she does not drink alcohol and does not use drugs.     FH: Reviewed with patient, not pertinent to this visit.    Review of Systems   Constitutional: Negative.    Musculoskeletal:        R knee injury, pain   Skin: Negative for rash.   Neurological: Negative.    All other systems reviewed and are negative.    Additional details per HPI.      Objective:     /70 (BP Location: Right arm, Patient Position: Sitting, BP Cuff Size: Adult)   Pulse 65   Temp 36.9 °C (98.5 °F) (Temporal)   Resp 16   Ht 1.524 m (5')   Wt 53.1 kg (117 lb)   SpO2 99%   BMI 22.85 kg/m²     Physical Exam  Vitals reviewed.   Constitutional:       General: She is not in acute distress.     Appearance: She is well-developed. She is not ill-appearing or toxic-appearing.   HENT:      Head: Normocephalic.      Right Ear: External ear normal.      Left Ear: External ear normal.   Eyes:      General: Vision grossly intact.      Extraocular Movements: Extraocular movements intact.      Conjunctiva/sclera: Conjunctivae normal.   Cardiovascular:      Rate and Rhythm: Normal rate.      Pulses: Normal pulses.   Pulmonary:      Effort: Pulmonary effort is normal. No respiratory distress.   Musculoskeletal:         General: No deformity.      Cervical back: Normal range of motion.      Right knee: Swelling present. No deformity, erythema, ecchymosis or lacerations. Decreased range of motion. Tenderness (Anteromedial and anterolateral joint lines) present. No LCL laxity, MCL laxity, ACL laxity or PCL laxity. Normal patellar mobility.      Instability Tests: Negative medial Adrienne test and negative lateral Adrienne  test.   Skin:     General: Skin is warm and dry.      Coloration: Skin is not pale.   Neurological:      Mental Status: She is alert and oriented to person, place, and time.      Sensory: No sensory deficit.      Motor: No weakness.      Coordination: Coordination normal.      Gait: Gait abnormal.   Psychiatric:         Behavior: Behavior normal. Behavior is cooperative.     X-ray of right knee:    Details    Reading Physician Reading Date Result Priority   Martha Carlos M.D.  [phone] 2/28/2021       Narrative & Impression        2/28/2021 1:31 PM     HISTORY/REASON FOR EXAM:  Pain/Deformity Following Trauma    TECHNIQUE/EXAM DESCRIPTION AND NUMBER OF VIEWS:  4 views of the RIGHT knee.     COMPARISON: None     FINDINGS:  There is no evidence of fracture or dislocation. Alignment is maintained.  No suprapatellar joint effusion is seen.  No soft tissue swelling is noted.    IMPRESSION:     No evidence of acute fracture or dislocation.             Last Resulted: 02/28/21  1:55 PM        Radiology report and images reviewed by myself. Concur with findings.      Assessment/Plan:     1. Injury of right knee, initial encounter  - DX-KNEE COMPLETE 4+ RIGHT; Future  - REFERRAL TO SPORTS MEDICINE    2. Acute pain of right knee  - DX-KNEE COMPLETE 4+ RIGHT; Future  - REFERRAL TO SPORTS MEDICINE    3. Knee strain, right, initial encounter  - REFERRAL TO SPORTS MEDICINE    Discussed rest, ice, compression, and elevation (RICE) and over-the-counter acetaminophen, per 's instructions, as needed for pain.  Patient reports she is unable to take NSAIDs due to her history of GERD.  Rx for pain medication declined.  Elastic bandage applied to the right knee.  Knee brace applied.  Likely strain, but patient should follow up with sports medicine especially if symptoms do not improve within a week. Referral placed for sports medicine follow-up, evaluation, and treatment.  Work note provided.    Differential  diagnosis, natural history, supportive care, over-the-counter symptom management per 's instructions, close monitoring, and indications for immediate follow-up discussed.     Patient advised to: Return for 1) Symptoms that worsen/don't improve, or go to ER, 2) Follow up with primary care in 7-10 days.    All questions answered. Patient agrees with the plan of care.    Discharge summary provided.    Billing note: patient billed as a new patient as the patient has not received any professional medical services from myself or another provider of the same specialty (family medicine) who belongs to the same group practice within the previous 3 years.

## 2021-02-28 NOTE — LETTER
February 28, 2021         Patient: Brannon Williamson   YOB: 1985   Date of Visit: 2/28/2021           To Whom it May Concern:    Brannon Williamson was seen in my clinic on 2/28/2021 due to right knee injury and pain. Due to medical necessity, please excuse patient from work for up to the next 3 days as needed.     If you have any questions or concerns, please don't hesitate to call.        Sincerely,           KILEY Ba.  Electronically Signed

## 2021-03-04 ENCOUNTER — OFFICE VISIT (OUTPATIENT)
Dept: URGENT CARE | Facility: PHYSICIAN GROUP | Age: 36
End: 2021-03-04
Payer: COMMERCIAL

## 2021-03-04 ENCOUNTER — TELEPHONE (OUTPATIENT)
Dept: SCHEDULING | Facility: IMAGING CENTER | Age: 36
End: 2021-03-04

## 2021-03-04 VITALS
OXYGEN SATURATION: 98 % | HEIGHT: 60 IN | TEMPERATURE: 98.2 F | HEART RATE: 72 BPM | DIASTOLIC BLOOD PRESSURE: 62 MMHG | WEIGHT: 117 LBS | SYSTOLIC BLOOD PRESSURE: 118 MMHG | BODY MASS INDEX: 22.97 KG/M2 | RESPIRATION RATE: 16 BRPM

## 2021-03-04 DIAGNOSIS — S83.91XA SPRAIN OF RIGHT KNEE, UNSPECIFIED LIGAMENT, INITIAL ENCOUNTER: ICD-10-CM

## 2021-03-04 DIAGNOSIS — M25.461 EFFUSION OF RIGHT KNEE: ICD-10-CM

## 2021-03-04 PROCEDURE — 99214 OFFICE O/P EST MOD 30 MIN: CPT | Performed by: PHYSICIAN ASSISTANT

## 2021-03-04 ASSESSMENT — ENCOUNTER SYMPTOMS: MYALGIAS: 0

## 2021-03-04 ASSESSMENT — FIBROSIS 4 INDEX: FIB4 SCORE: 0.8

## 2021-03-04 ASSESSMENT — PAIN SCALES - GENERAL: PAINLEVEL: 4=SLIGHT-MODERATE PAIN

## 2021-03-04 NOTE — LETTER
March 4, 2021    To Whom It May Concern:         This is confirmation that Brannon Williamson attended her scheduled appointment with Charles Wagner P.A.-C. on 3/04/21. She may return to work, but I recommend work restrictions. I recommend that she be on her feet for no longer than 4 hrs cumulatively. Please allow her to rest when needed.         If you have any questions please do not hesitate to call me at the phone number listed below.    Sincerely,          Charles Wagner P.A.-C.  [phone]

## 2021-03-05 NOTE — PROGRESS NOTES
Subjective:   Brannon Williamson is a 35 y.o. female who presents for Knee Pain (accident happened on Saturday when patient was lifting at the gym. Pt states it is still swollen and can some weight baring. )        Patient presents for reevaluation of knee pain.  Incident happened last weekend and she was subsequently evaluated in urgent care.  Imaging was normal and exam was normal.  Patient was referred to sports med for further evaluation.  Patient continues to have pain numbness predominantly lateral.  She states that the knee also feels unstable and looks swollen.  Pain worse with weightbearing and increased activity.  She attempted to go to work yesterday, but states her employer turned her away.  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  She is not having any distal numbness or tingling.  She has been icing and elevating, but has not taken NSAIDs, as these cause her GERD.     Review of Systems   Musculoskeletal: Positive for joint pain. Negative for myalgias.       PMH:  has a past medical history of Acid reflux, Anesthesia, and Cold.  MEDS:   Current Outpatient Medications:   •  clobetasol (TEMOVATE) 0.05 % Ointment, Apply as a thin layer to affected area once daily (Patient not taking: Reported on 2/28/2021), Disp: 1 Tube, Rfl: 0  •  famotidine (PEPCID) 40 MG Tab, Take 1 Tab by mouth every day. At night (Patient not taking: Reported on 2/28/2021), Disp: 60 Tab, Rfl: 0  •  omeprazole (PRILOSEC) 20 MG delayed-release capsule, Take 1 Cap by mouth every day. In the morning (Patient not taking: Reported on 2/28/2021), Disp: 60 Cap, Rfl: 0  ALLERGIES:   Allergies   Allergen Reactions   • Crab Anaphylaxis     SURGHX:   Past Surgical History:   Procedure Laterality Date   • TUBAL COAGULATION LAPAROSCOPIC BILATERAL Bilateral 10/19/2016    Procedure: TUBAL COAGULATION LAPAROSCOPIC BILATERAL;  Surgeon: Lucia Clemons M.D.;  Location: SURGERY [facility];  Service:    • INTRA  UTERINE DEVICE REMOVAL  10/19/2016    Procedure: INTRA UTERINE DEVICE REMOVAL;  Surgeon: Lucia Clemons M.D.;  Location: SURGERY [facility];  Service:    • ENDOSCOPY       SOCHX:  reports that she has never smoked. She has never used smokeless tobacco. She reports that she does not drink alcohol and does not use drugs.  FH: Family history was reviewed, no pertinent findings to report   Objective:   /62 (BP Location: Right arm, Patient Position: Sitting, BP Cuff Size: Adult)   Pulse 72   Temp 36.8 °C (98.2 °F) (Temporal)   Resp 16   Ht 1.524 m (5')   Wt 53.1 kg (117 lb)   SpO2 98%   BMI 22.85 kg/m²   Physical Exam  Vitals reviewed.   Constitutional:       General: She is not in acute distress.     Appearance: Normal appearance. She is well-developed. She is not toxic-appearing.   HENT:      Head: Normocephalic and atraumatic.      Right Ear: External ear normal.      Left Ear: External ear normal.      Nose: Nose normal.   Eyes:      General: Gaze aligned appropriately.   Cardiovascular:      Rate and Rhythm: Normal rate and regular rhythm.   Pulmonary:      Effort: Pulmonary effort is normal. No respiratory distress.      Breath sounds: No stridor.   Musculoskeletal:      Cervical back: Neck supple.      Comments: Right knee  Appearance - No bruising, erythema, or deformity appreciated  Palpation -  Mild joint effusion. TTP lateral joint line and gerdy's tubercle and posterior knee. No tenderness to palpation along patellar tendon and quads.    Neuro - Sensation equal and intact bilaterally  Special testing - mild laxity with end point with valgus stress.  No laxity or pain with varus stress, neg anterior drawer, neg posterior drawer, neg Lachman's, neg Adrienne's, neg patellar apprehension test     Skin:     General: Skin is warm and dry.      Capillary Refill: Capillary refill takes less than 2 seconds.   Neurological:      Mental Status: She is alert and oriented to person, place, and time.       Comments: CN2-12 grossly intact   Psychiatric:         Speech: Speech normal.         Behavior: Behavior normal.           Assessment/Plan:   1. Sprain of right knee, unspecified ligament, initial encounter    2. Effusion of right knee    Records from 2/28/21 reviewed. Knee reexamined.  Patient does have a small effusion.   Right MCL feels slightly lax as compared to left, but there is definite endpoint and patient did not report pain with valgus stress.  Patient did guard a bit during the exam, which may have affected testing.    Patient continue to wear Ace bandage as needed and hinged knee brace for support and stability.  She may return to work, but I  recommend work restrictions.  She was given a note delineating this.    Patient may take Tylenol for pain.  She has history of GERD and is hesitant to use NSAIDs.  She might try Aleve with food and see if she tolerates this better than ibuprofen, which has caused her issues in the past.  Continue to ice and elevate.  Follow-up with sports medicine as scheduled next week.  If she has any concerns or new symptoms in the interim she may return to urgent care for reevaluation.    Differential diagnosis, natural history, supportive care, and indications for immediate follow-up discussed.

## 2021-03-08 ENCOUNTER — TELEPHONE (OUTPATIENT)
Dept: URGENT CARE | Facility: PHYSICIAN GROUP | Age: 36
End: 2021-03-08

## 2021-03-08 NOTE — TELEPHONE ENCOUNTER
1. Caller Name: Glaiza Cholo Giron                        Call Back Number: [phone] (home)         Pt called asking if you could extend her work note to last all this week?  She does have a specialist appt at the end of the week and can get future notes from them, but needs something to get her by until that appointment?  Please Advise

## 2021-03-08 NOTE — TELEPHONE ENCOUNTER
Yehuda Lee,    Yes, that is okay. Please draft her note for the entire week- she can  at her convenience. Any future notes beyond this week will need to come from her specialty provider.    Thank you!  RAVEN

## 2021-03-09 ENCOUNTER — OFFICE VISIT (OUTPATIENT)
Dept: URGENT CARE | Facility: PHYSICIAN GROUP | Age: 36
End: 2021-03-09
Payer: COMMERCIAL

## 2021-03-09 VITALS
RESPIRATION RATE: 16 BRPM | SYSTOLIC BLOOD PRESSURE: 130 MMHG | BODY MASS INDEX: 23.16 KG/M2 | OXYGEN SATURATION: 99 % | HEART RATE: 61 BPM | DIASTOLIC BLOOD PRESSURE: 74 MMHG | HEIGHT: 60 IN | WEIGHT: 118 LBS | TEMPERATURE: 98.4 F

## 2021-03-09 DIAGNOSIS — M25.561 ACUTE PAIN OF RIGHT KNEE: ICD-10-CM

## 2021-03-09 PROCEDURE — 99213 OFFICE O/P EST LOW 20 MIN: CPT | Performed by: NURSE PRACTITIONER

## 2021-03-09 ASSESSMENT — FIBROSIS 4 INDEX: FIB4 SCORE: 0.8

## 2021-03-09 NOTE — LETTER
March 9, 2021    To Whom It May Concern:         This is confirmation that Brannon Williamson attended her scheduled appointment with FINN Grissom on 3/09/21.    Please excuse her from work 3/9/21-3/10/21.    Sincerely,          KILEY Grissom.  [phone]

## 2021-03-10 NOTE — PROGRESS NOTES
Subjective:      Brannon Williamson is a 35 y.o. female who presents with Knee Pain (R knee pain, stiffness, swelling, x1 week )            Knee Pain  This is a new problem. Episode onset: pt reports new onset of right knee pain that started about 2 weeks ago when she was exercising. She states she had to call out of work today because it started to hurt again and she needs a note. She will be seeing Sports Med in 2 days. The problem has been unchanged. She has tried rest (currently wearing a hinged knee brace) for the symptoms. The treatment provided mild relief.       Review of Systems   Musculoskeletal: Positive for joint pain.   All other systems reviewed and are negative.    Past Medical History:   Diagnosis Date   • Acid reflux    • Anesthesia     postop n/v   • Cold     10-      Past Surgical History:   Procedure Laterality Date   • TUBAL COAGULATION LAPAROSCOPIC BILATERAL Bilateral 10/19/2016    Procedure: TUBAL COAGULATION LAPAROSCOPIC BILATERAL;  Surgeon: Lucia Clemons M.D.;  Location: SURGERY [facility];  Service:    • INTRA UTERINE DEVICE REMOVAL  10/19/2016    Procedure: INTRA UTERINE DEVICE REMOVAL;  Surgeon: Lucia Clemons M.D.;  Location: SURGERY [facility];  Service:    • ENDOSCOPY        Social History     Socioeconomic History   • Marital status:      Spouse name: Not on file   • Number of children: Not on file   • Years of education: Not on file   • Highest education level: Not on file   Occupational History   • Not on file   Tobacco Use   • Smoking status: Never Smoker   • Smokeless tobacco: Never Used   Substance and Sexual Activity   • Alcohol use: Yes     Alcohol/week: 0.0 oz     Comment: Occ   • Drug use: No   • Sexual activity: Not on file   Other Topics Concern   • Not on file   Social History Narrative   • Not on file     Social Determinants of Health     Financial Resource Strain:    • Difficulty of Paying Living Expenses:    Food Insecurity:    • Worried About Running  Out of Food in the Last Year:    • Ran Out of Food in the Last Year:    Transportation Needs:    • Lack of Transportation (Medical):    • Lack of Transportation (Non-Medical):    Physical Activity:    • Days of Exercise per Week:    • Minutes of Exercise per Session:    Stress:    • Feeling of Stress :    Social Connections:    • Frequency of Communication with Friends and Family:    • Frequency of Social Gatherings with Friends and Family:    • Attends [employer] Services:    • Active Member of Clubs or Organizations:    • Attends Club or Organization Meetings:    • Marital Status:    Intimate Partner Violence:    • Fear of Current or Ex-Partner:    • Emotionally Abused:    • Physically Abused:    • Sexually Abused:           Objective:     /74 (BP Location: Right arm, Patient Position: Sitting, BP Cuff Size: Adult)   Pulse 61   Temp 36.9 °C (98.4 °F) (Temporal)   Resp 16   Ht 1.524 m (5')   Wt 53.5 kg (118 lb)   SpO2 99%   BMI 23.05 kg/m²      Physical Exam  Vitals and nursing note reviewed.   Constitutional:       Appearance: Normal appearance. She is normal weight.   HENT:      Head: Normocephalic and atraumatic.      Nose: Nose normal.      Mouth/Throat:      Mouth: Mucous membranes are moist.      Pharynx: Oropharynx is clear.   Eyes:      Extraocular Movements: Extraocular movements intact.      Pupils: Pupils are equal, round, and reactive to light.   Cardiovascular:      Rate and Rhythm: Normal rate and regular rhythm.   Pulmonary:      Effort: Pulmonary effort is normal.   Musculoskeletal:         General: Normal range of motion.      Cervical back: Normal range of motion.      Right knee: Effusion (mild) present. No swelling, deformity or bony tenderness. Normal range of motion. No LCL laxity or MCL laxity.   Skin:     General: Skin is warm and dry.      Capillary Refill: Capillary refill takes less than 2 seconds.   Neurological:      General: No focal deficit present.      Mental Status: She  is alert and oriented to person, place, and time. Mental status is at baseline.   Psychiatric:         Mood and Affect: Mood normal.         Speech: Speech normal.         Thought Content: Thought content normal.         Judgment: Judgment normal.                 Assessment/Plan:        1. Acute pain of right knee    Continue to rest knee and wear hinged knee brace  Work note provided  Follow up with SM in 2 days  Instructed to return to  or nearest emergency department if symptoms fail to improve, for any change in condition, further concerns, or new concerning symptoms.  Patient states understanding of the plan of care and discharge instructions.

## 2021-03-11 ENCOUNTER — OFFICE VISIT (OUTPATIENT)
Dept: MEDICAL GROUP | Facility: CLINIC | Age: 36
End: 2021-03-11
Payer: COMMERCIAL

## 2021-03-11 VITALS
TEMPERATURE: 98.5 F | RESPIRATION RATE: 16 BRPM | WEIGHT: 118 LBS | BODY MASS INDEX: 23.16 KG/M2 | SYSTOLIC BLOOD PRESSURE: 118 MMHG | HEIGHT: 60 IN | OXYGEN SATURATION: 97 % | DIASTOLIC BLOOD PRESSURE: 72 MMHG | HEART RATE: 69 BPM

## 2021-03-11 DIAGNOSIS — R29.898 POSITIVE LACHMAN TEST: ICD-10-CM

## 2021-03-11 DIAGNOSIS — M25.361 KNEE INSTABILITY, RIGHT: ICD-10-CM

## 2021-03-11 DIAGNOSIS — M23.91 INTERNAL DERANGEMENT OF RIGHT KNEE: ICD-10-CM

## 2021-03-11 PROCEDURE — 99203 OFFICE O/P NEW LOW 30 MIN: CPT | Performed by: FAMILY MEDICINE

## 2021-03-11 SDOH — HEALTH STABILITY: PHYSICAL HEALTH: ON AVERAGE, HOW MANY MINUTES DO YOU ENGAGE IN EXERCISE AT THIS LEVEL?: 140 MINUTES

## 2021-03-11 SDOH — HEALTH STABILITY: PHYSICAL HEALTH: ON AVERAGE, HOW MANY DAYS PER WEEK DO YOU ENGAGE IN MODERATE TO STRENUOUS EXERCISE (LIKE A BRISK WALK)?: 7 DAYS

## 2021-03-11 SDOH — ECONOMIC STABILITY: INCOME INSECURITY: IN THE LAST 12 MONTHS, WAS THERE A TIME WHEN YOU WERE NOT ABLE TO PAY THE MORTGAGE OR RENT ON TIME?: NO

## 2021-03-11 SDOH — HEALTH STABILITY: MENTAL HEALTH
STRESS IS WHEN SOMEONE FEELS TENSE, NERVOUS, ANXIOUS, OR CAN'T SLEEP AT NIGHT BECAUSE THEIR MIND IS TROUBLED. HOW STRESSED ARE YOU?: NOT AT ALL

## 2021-03-11 SDOH — ECONOMIC STABILITY: HOUSING INSECURITY
IN THE LAST 12 MONTHS, WAS THERE A TIME WHEN YOU DID NOT HAVE A STEADY PLACE TO SLEEP OR SLEPT IN A SHELTER (INCLUDING NOW)?: NO

## 2021-03-11 SDOH — ECONOMIC STABILITY: TRANSPORTATION INSECURITY
IN THE PAST 12 MONTHS, HAS LACK OF RELIABLE TRANSPORTATION KEPT YOU FROM MEDICAL APPOINTMENTS, MEETINGS, WORK OR FROM GETTING THINGS NEEDED FOR DAILY LIVING?: NO

## 2021-03-11 SDOH — ECONOMIC STABILITY: HOUSING INSECURITY

## 2021-03-11 SDOH — ECONOMIC STABILITY: TRANSPORTATION INSECURITY
IN THE PAST 12 MONTHS, HAS THE LACK OF TRANSPORTATION KEPT YOU FROM MEDICAL APPOINTMENTS OR FROM GETTING MEDICATIONS?: NO

## 2021-03-11 ASSESSMENT — SOCIAL DETERMINANTS OF HEALTH (SDOH)
HOW OFTEN DO YOU HAVE SIX OR MORE DRINKS ON ONE OCCASION: LESS THAN MONTHLY
HOW OFTEN DO YOU ATTEND CHURCH OR RELIGIOUS SERVICES?: MORE THAN 4 TIMES PER YEAR
HOW MANY DRINKS CONTAINING ALCOHOL DO YOU HAVE ON A TYPICAL DAY WHEN YOU ARE DRINKING: 1 OR 2
DO YOU BELONG TO ANY CLUBS OR ORGANIZATIONS SUCH AS CHURCH GROUPS UNIONS, FRATERNAL OR ATHLETIC GROUPS, OR SCHOOL GROUPS?: NO
WITHIN THE PAST 12 MONTHS, THE FOOD YOU BOUGHT JUST DIDN'T LAST AND YOU DIDN'T HAVE MONEY TO GET MORE: NEVER TRUE
HOW OFTEN DO YOU GET TOGETHER WITH FRIENDS OR RELATIVES?: MORE THAN THREE TIMES A WEEK
WITHIN THE PAST 12 MONTHS, YOU WORRIED THAT YOUR FOOD WOULD RUN OUT BEFORE YOU GOT THE MONEY TO BUY MORE: NEVER TRUE
IN A TYPICAL WEEK, HOW MANY TIMES DO YOU TALK ON THE PHONE WITH FAMILY, FRIENDS, OR NEIGHBORS?: MORE THAN THREE TIMES A WEEK
HOW OFTEN DO YOU ATTENT MEETINGS OF THE CLUB OR ORGANIZATION YOU BELONG TO?: NEVER
HOW OFTEN DO YOU HAVE A DRINK CONTAINING ALCOHOL: MONTHLY OR LESS

## 2021-03-11 ASSESSMENT — FIBROSIS 4 INDEX: FIB4 SCORE: 0.8

## 2021-03-11 NOTE — PROGRESS NOTES
CHIEF COMPLAINT:  Brannon Williamson female presenting at the request of FINN Ba  for evaluation of knee pain.     Brannon Williamson is complaining of right knee pain  Date of injury, February 26, 2021  Mechanism injury, performing workout where she was jumping in and out of tires and running around  She was listening to music at the time so she did not hear a pop, but she felt 1 and her friend that was working out with her heard a pop as well  Pain is at the anterolateral knee, but initially had pain at the anterior knee immediately after the incident  Quality is aching, sharp  Pain is non-radiating   Improved with resting  Aggravated by standing, walking, stairs  no prior problems with this area in the past   Prior Treatments: Seen in urgent care and provided with a hinged knee brace  Prior studies: X-Ray   Medications tried for pain include: none, she is tries to avoid pain medication secondary to GERD  Mechanical Symptom history: No Locking and Popping which can be painful    [employer] employee, picking, 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 No pertinent past medical history.    PMH:  has a past medical history of Acid reflux, Anesthesia, and Cold.  MEDS: No current outpatient medications on file.  ALLERGIES:   Allergies   Allergen Reactions   • Crab Anaphylaxis     SURGHX:   Past Surgical History:   Procedure Laterality Date   • TUBAL COAGULATION LAPAROSCOPIC BILATERAL Bilateral 10/19/2016    Procedure: TUBAL COAGULATION LAPAROSCOPIC BILATERAL;  Surgeon: Lucia Celmons M.D.;  Location: SURGERY [facility];  Service:    • INTRA UTERINE DEVICE REMOVAL  10/19/2016    Procedure: INTRA UTERINE DEVICE REMOVAL;  Surgeon: Lucia Clemons M.D.;   Location: SURGERY [facility];  Service:    • ENDOSCOPY       SOCHX:  reports that she has never smoked. She has never used smokeless tobacco. She reports current alcohol use. She reports that she does not use drugs.  FH: Family history was reviewed, no pertinent findings to report     PHYSICAL EXAM:  /72 (BP Location: Left arm, Patient Position: Sitting, BP Cuff Size: Adult)   Pulse 69   Temp 36.9 °C (98.5 °F) (Temporal)   Resp 16   Ht 1.524 m (5')   Wt 53.5 kg (118 lb)   SpO2 97%   BMI 23.05 kg/m²      well-developed, well-nourished in no apparent distress, alert and oriented x 3.  Gait: antalgic     RIGHT Knee:  Slight Varus and No Swelling  Range of Motion Slightly limited with Flexion and Slightly limited with Extension  1+ effusion  Patellar No tenderness and no apprehension  Medial Joint Line Tenderness and NEGATIVE Adrienne but she was very apprehensive during this maneuver  Lateral Joint Line Tenderness and NEGATIVE Adrienne  Trace Laxity with Varus stress  Trace Laxity with Valgus stress  Lachman's testing is 2+   Posterior Drawer Testing is 2+  The leg is otherwise neurovascularly intact    LEFT Knee:  Slight Varus and No Swelling   Range of Motion Intact  Trace effusion  Patellar No tenderness and no apprehension  Medial Joint Line Non-tender and NEGATIVE Adrienne  Lateral Joint Line Non-tender and NEGATIVE Adrienne  Trace Laxity with Varus stress  Trace Laxity with Valgus stress  Lachman's testing is Trace  Posterior Drawer Testing is Trace  The leg is otherwise neurovascularly intact    Additional Findings: Tight hamstrings      1. Internal derangement of right knee  MR-KNEE-W/O RIGHT   2. Knee instability, right  MR-KNEE-W/O RIGHT   3. Positive Lachman Test  MR-KNEE-W/O RIGHT     Date of injury, February 26, 2021  Mechanism injury, performing workout where she was jumping in and out of tires and running around  She was listening to music at the time so she did not hear a pop, but she  "felt one and her friend that was working out with her heard a pop as well    Her knee feels \"unstable\" when she is walking up or down stairs  POSITIVE Lachman's with POSITIVE anterior drawer sign  Highly suspicious for ACL tear    Check MRI for evaluation for ACL tear    Provided work restrictions to avoid standing greater than 10 minutes/h and avoid lifting greater than 15 pounds until reevaluation or a minimum of 2 weeks time.  She will likely need to apply for short-term disability since her work is very physical.    Return in about 1 week (around 3/18/2021).        2/28/2021 1:31 PM     HISTORY/REASON FOR EXAM:  Pain/Deformity Following Trauma        TECHNIQUE/EXAM DESCRIPTION AND NUMBER OF VIEWS:  4 views of the RIGHT knee.     COMPARISON: None     FINDINGS:  There is no evidence of fracture or dislocation. Alignment is maintained.  No suprapatellar joint effusion is seen.  No soft tissue swelling is noted.        IMPRESSION:     No evidence of acute fracture or dislocation.    done elsewhere and reviewed independently by me    Thank you Ponce Mcgrath, A.P.R.N. for allowing me to participate in caring for your patient.  "

## 2021-03-11 NOTE — Clinical Note
Yehuda Randall,  Thank you for referring Brannon to our sports medicine clinic.  Given her history of hearing a pop during activity and the sense of instability together with POSITIVE Lachman's and POSITIVE anterior drawer test, we are suspecting a ACL tear.  We have ordered an MRI to confirm our suspicion and we will see her back after the MRI to discuss results and operative versus nonoperative management.  Thanks!  L

## 2021-03-11 NOTE — LETTER
March 11, 2021         Patient: Brannon Williamson   YOB: 1985   Date of Visit: 3/11/2021           To Whom it May Concern:    Brannon Williamson was seen in my clinic on 3/11/2021. She may return to work but she should avoid lifting greater than 15 pounds and avoid standing greater than 10 minutes/h until re-evaluation.  Limitations are in place for at least another 2 weeks.    If you have any questions or concerns, please don't hesitate to call.        Sincerely,           Kt Langford M.D.  Electronically Signed

## 2021-03-22 ENCOUNTER — DOCUMENTATION (OUTPATIENT)
Dept: MEDICAL GROUP | Facility: CLINIC | Age: 36
End: 2021-03-22

## 2021-03-22 ENCOUNTER — APPOINTMENT (OUTPATIENT)
Dept: RADIOLOGY | Facility: MEDICAL CENTER | Age: 36
End: 2021-03-22
Attending: FAMILY MEDICINE
Payer: COMMERCIAL

## 2021-03-22 DIAGNOSIS — M25.361 KNEE INSTABILITY, RIGHT: ICD-10-CM

## 2021-03-22 DIAGNOSIS — R29.898 POSITIVE LACHMAN TEST: ICD-10-CM

## 2021-03-22 DIAGNOSIS — M23.91 INTERNAL DERANGEMENT OF RIGHT KNEE: ICD-10-CM

## 2021-03-22 PROCEDURE — 73721 MRI JNT OF LWR EXTRE W/O DYE: CPT | Mod: RT

## 2021-03-23 ENCOUNTER — OFFICE VISIT (OUTPATIENT)
Dept: MEDICAL GROUP | Facility: CLINIC | Age: 36
End: 2021-03-23
Payer: COMMERCIAL

## 2021-03-23 VITALS — WEIGHT: 118 LBS | BODY MASS INDEX: 23.16 KG/M2 | HEIGHT: 60 IN

## 2021-03-23 DIAGNOSIS — S83.511A RUPTURE OF ANTERIOR CRUCIATE LIGAMENT OF RIGHT KNEE, INITIAL ENCOUNTER: ICD-10-CM

## 2021-03-23 PROCEDURE — 99213 OFFICE O/P EST LOW 20 MIN: CPT | Performed by: FAMILY MEDICINE

## 2021-03-23 ASSESSMENT — FIBROSIS 4 INDEX: FIB4 SCORE: 0.8

## 2021-03-23 NOTE — PROGRESS NOTES
1. Rupture of anterior cruciate ligament of right knee, initial encounter  REFERRAL TO ORTHOPEDICS     Patient is currently on short-term disability and would like to have her surgery done in a timely fashion so she can get back to work    Patient is active, likes to do heavy workouts and she likes to run  She feels her knee is unstable  We discussed operative versus nonoperative management  Given her activity level and fitness level, recommend ACL reconstruction    Patient was in agreement and has elected to proceed with surgical consideration  Ortho Referral (Judd)          3/22/2021 2:27 PM     HISTORY/REASON FOR EXAM:  Right knee pain and instability.     TECHNIQUE/EXAM DESCRIPTION:  MRI of the RIGHT knee without contrast.     Using a Siemens Skyra 3.0 Kelly MRI scanner, T1 coronal, proton density fat-suppressed coronal, fast spin-echo T2 fat-suppressed axial, intermediate fast spin-echo sagittal, and intermediate fast spin-echo fat-suppressed sagittal images were obtained.     COMPARISON:     FINDINGS:     Menisci: Intact.     Tendons and Ligaments: There is full-thickness tear of the mid substance of the anterior cruciate ligament. The posterior cruciate ligament is intact. The collateral ligaments are intact.     Extensor Mechanism/Patella: The extensor mechanism is intact.   The articular cartilage is intact.     Osseous Structures/Cartilaginous surfaces: Intact. No evidence of marrow edema or fracture.     Miscellaneous: There is a small joint effusion. There is a small popliteal cyst.     IMPRESSION:     1.  Full-thickness tear of the mid substance of the anterior cruciate ligament.     2.  Intact menisci.     3.  Small joint effusion.     4.  Small popliteal cyst.

## 2021-04-01 PROBLEM — Z00.00 HEALTH CARE MAINTENANCE: Status: RESOLVED | Noted: 2017-12-07 | Resolved: 2021-04-01

## 2021-04-01 PROBLEM — L20.84 INTRINSIC ECZEMA: Status: RESOLVED | Noted: 2018-11-19 | Resolved: 2021-04-01

## 2021-04-01 PROBLEM — R06.9 BREATHING PROBLEM: Status: RESOLVED | Noted: 2017-06-05 | Resolved: 2021-04-01

## 2021-04-01 PROBLEM — Z00.00 ANNUAL PHYSICAL EXAM: Status: RESOLVED | Noted: 2018-11-19 | Resolved: 2021-04-01

## 2021-04-01 PROBLEM — Z29.9 PREVENTIVE MEASURE: Status: RESOLVED | Noted: 2017-06-05 | Resolved: 2021-04-01

## 2021-04-01 PROBLEM — D64.9 ANEMIA: Status: RESOLVED | Noted: 2017-06-05 | Resolved: 2021-04-01

## 2021-04-01 PROBLEM — L60.0 INGROWN TOENAIL: Status: RESOLVED | Noted: 2018-11-19 | Resolved: 2021-04-01

## 2021-04-01 ASSESSMENT — ENCOUNTER SYMPTOMS
SHORTNESS OF BREATH: 0
BLURRED VISION: 0
DIARRHEA: 0
NAUSEA: 0
DEPRESSION: 0
CHILLS: 0
VOMITING: 0
FEVER: 0
WEAKNESS: 0
CONSTIPATION: 0
PALPITATIONS: 0

## 2021-04-01 NOTE — PROGRESS NOTES
History of Present Illness  35 year old female presents to clinic to establish care.  She has a full-thickness tear of her right ACL that occurred on 2/20/2021 while she was working out.  She has already seen sports medicine and orthopedic surgery for this.  She is in the process of doing physical therapy, and has a surgical date for May.    Otherwise she does not take any prescription medication, and is doing generally well.    She denies any other questions or concerns at this time.    ROS  Review of Systems   Constitutional: Negative for chills and fever.   HENT: Negative for hearing loss.    Eyes: Negative for blurred vision.   Respiratory: Negative for shortness of breath.    Cardiovascular: Negative for chest pain and palpitations.   Gastrointestinal: Negative for constipation, diarrhea, nausea and vomiting.   Genitourinary: Negative for dysuria and hematuria.   Musculoskeletal: Positive for joint pain (right knee).   Skin: Negative for rash.   Neurological: Negative for weakness.   Psychiatric/Behavioral: Negative for depression.     Medications  No current outpatient medications on file.     No current facility-administered medications for this visit.     Allergies  Allergies   Allergen Reactions   • Crab Anaphylaxis     Problem List  Patient Active Problem List   Diagnosis   • Hypokalemia     Past Medical History  Past Medical History:   Diagnosis Date   • Acid reflux    • Anesthesia     postop n/v   • Cold     10-     Past Surgical History  Past Surgical History:   Procedure Laterality Date   • TUBAL COAGULATION LAPAROSCOPIC BILATERAL Bilateral 10/19/2016    Procedure: TUBAL COAGULATION LAPAROSCOPIC BILATERAL;  Surgeon: Lucia Clemons M.D.;  Location: [facility];  Service:    • INTRA UTERINE DEVICE REMOVAL  10/19/2016    Procedure: INTRA UTERINE DEVICE REMOVAL;  Surgeon: Lucia Clemons M.D.;  Location: [facility];  Service:    • ENDOSCOPY       Past Family History  Family History    Problem Relation Age of Onset   • Other Mother         auto-immune   • Psoriasis Father    • No Known Problems Sister    • Heart Disease Maternal Grandfather    • Heart Disease Paternal Grandfather    • No Known Problems Daughter    • No Known Problems Daughter      Social History  She reports eating a healthy and balanced diet, as well as getting regular exercise. She is currently on disability due to her right ACL tear, but is employed by  [employer]. She drinks an average of 0-2 alcoholic beverages per month. She denies any tobacco product or illicit drug use. She is sexually active with one, male partner and she has had a tubal ligation for contraception.     Physical Exam  /58   Pulse 64   Temp 36.6 °C (97.8 °F) (Temporal)   Resp 16   Ht 1.524 m (5')   Wt 51 kg (112 lb 7 oz)   SpO2 99%   BMI 21.96 kg/m²   Physical Exam   Constitutional: She is well-developed, well-nourished, and in no distress. No distress.   HENT:   Head: Normocephalic and atraumatic.   Right Ear: Tympanic membrane, external ear and ear canal normal.   Left Ear: Tympanic membrane, external ear and ear canal normal.   Eyes: Pupils are equal, round, and reactive to light. Right eye exhibits no discharge. Left eye exhibits no discharge. No scleral icterus.   Neck: No thyromegaly present.   Cardiovascular: Normal rate, regular rhythm and normal heart sounds.   Pulmonary/Chest: Effort normal and breath sounds normal. No respiratory distress.   Abdominal: Soft. Bowel sounds are normal. She exhibits no distension. There is no abdominal tenderness.   Musculoskeletal:         General: No edema.   Neurological: She is alert.   Skin: Skin is warm and dry. She is not diaphoretic.   Psychiatric: Affect and judgment normal.     Assessment & Plan  1. Visit for preventive health examination  Health maintenance status reviewed and updated. We discussed diet, exercise, vaccinations, skin cancer prevention and detection, seat belt use, and regular  eye and dental exams.    2. Rupture of anterior cruciate ligament of right knee, sequela  She already has the appropriate follow-up in place.    Return in about 1 year (around 4/5/2022) for Annual physical.    Re Henry M.D.

## 2021-04-05 ENCOUNTER — OFFICE VISIT (OUTPATIENT)
Dept: MEDICAL GROUP | Facility: MEDICAL CENTER | Age: 36
End: 2021-04-05
Payer: COMMERCIAL

## 2021-04-05 VITALS
HEIGHT: 60 IN | BODY MASS INDEX: 22.07 KG/M2 | RESPIRATION RATE: 16 BRPM | DIASTOLIC BLOOD PRESSURE: 58 MMHG | HEART RATE: 64 BPM | WEIGHT: 112.43 LBS | TEMPERATURE: 97.8 F | SYSTOLIC BLOOD PRESSURE: 100 MMHG | OXYGEN SATURATION: 99 %

## 2021-04-05 DIAGNOSIS — Z00.00 VISIT FOR PREVENTIVE HEALTH EXAMINATION: ICD-10-CM

## 2021-04-05 DIAGNOSIS — S83.511S RUPTURE OF ANTERIOR CRUCIATE LIGAMENT OF RIGHT KNEE, SEQUELA: ICD-10-CM

## 2021-04-05 PROBLEM — K21.9 GASTROESOPHAGEAL REFLUX DISEASE WITHOUT ESOPHAGITIS: Status: RESOLVED | Noted: 2017-12-07 | Resolved: 2021-04-05

## 2021-04-05 PROCEDURE — 99395 PREV VISIT EST AGE 18-39: CPT | Performed by: FAMILY MEDICINE

## 2021-04-05 ASSESSMENT — PATIENT HEALTH QUESTIONNAIRE - PHQ9: CLINICAL INTERPRETATION OF PHQ2 SCORE: 0

## 2021-04-22 ENCOUNTER — HOSPITAL ENCOUNTER (OUTPATIENT)
Dept: LAB | Facility: MEDICAL CENTER | Age: 36
End: 2021-04-22
Attending: ANESTHESIOLOGY
Payer: COMMERCIAL

## 2021-04-22 LAB
COVID ORDER STATUS COVID19: NORMAL
SARS-COV-2 RNA RESP QL NAA+PROBE: NOTDETECTED
SPECIMEN SOURCE: NORMAL

## 2021-04-22 PROCEDURE — C9803 HOPD COVID-19 SPEC COLLECT: HCPCS

## 2021-04-22 PROCEDURE — U0005 INFEC AGEN DETEC AMPLI PROBE: HCPCS

## 2021-04-22 PROCEDURE — U0003 INFECTIOUS AGENT DETECTION BY NUCLEIC ACID (DNA OR RNA); SEVERE ACUTE RESPIRATORY SYNDROME CORONAVIRUS 2 (SARS-COV-2) (CORONAVIRUS DISEASE [COVID-19]), AMPLIFIED PROBE TECHNIQUE, MAKING USE OF HIGH THROUGHPUT TECHNOLOGIES AS DESCRIBED BY CMS-2020-01-R: HCPCS

## 2021-06-29 ENCOUNTER — HOSPITAL ENCOUNTER (OUTPATIENT)
Dept: LAB | Facility: MEDICAL CENTER | Age: 36
End: 2021-06-29
Attending: FAMILY MEDICINE
Payer: COMMERCIAL

## 2021-06-29 DIAGNOSIS — Z11.1 SCREENING-PULMONARY TB: ICD-10-CM

## 2021-06-29 PROCEDURE — 86480 TB TEST CELL IMMUN MEASURE: CPT

## 2021-06-29 PROCEDURE — 36415 COLL VENOUS BLD VENIPUNCTURE: CPT

## 2021-07-01 DIAGNOSIS — R76.12 POSITIVE QUANTIFERON-TB GOLD TEST: ICD-10-CM

## 2021-07-01 LAB
GAMMA INTERFERON BACKGROUND BLD IA-ACNC: 0.14 IU/ML
M TB IFN-G BLD-IMP: POSITIVE
M TB IFN-G CD4+ BCKGRND COR BLD-ACNC: 0.67 IU/ML
MITOGEN IGNF BCKGRD COR BLD-ACNC: >10 IU/ML
QFT TB2 - NIL TBQ2: 0.83 IU/ML

## 2021-07-27 ENCOUNTER — HOSPITAL ENCOUNTER (OUTPATIENT)
Dept: RADIOLOGY | Facility: MEDICAL CENTER | Age: 36
End: 2021-07-27
Attending: PHYSICIAN ASSISTANT
Payer: COMMERCIAL

## 2021-07-27 DIAGNOSIS — R76.12 POSITIVE QUANTIFERON-TB GOLD TEST: ICD-10-CM

## 2021-07-27 PROCEDURE — 71046 X-RAY EXAM CHEST 2 VIEWS: CPT

## 2021-10-24 ENCOUNTER — HOSPITAL ENCOUNTER (OUTPATIENT)
Facility: MEDICAL CENTER | Age: 36
End: 2021-10-24
Attending: STUDENT IN AN ORGANIZED HEALTH CARE EDUCATION/TRAINING PROGRAM
Payer: COMMERCIAL

## 2021-10-24 ENCOUNTER — OFFICE VISIT (OUTPATIENT)
Dept: URGENT CARE | Facility: PHYSICIAN GROUP | Age: 36
End: 2021-10-24
Payer: COMMERCIAL

## 2021-10-24 VITALS
DIASTOLIC BLOOD PRESSURE: 78 MMHG | WEIGHT: 120 LBS | RESPIRATION RATE: 18 BRPM | OXYGEN SATURATION: 99 % | BODY MASS INDEX: 23.56 KG/M2 | SYSTOLIC BLOOD PRESSURE: 110 MMHG | HEART RATE: 70 BPM | TEMPERATURE: 98.7 F | HEIGHT: 60 IN

## 2021-10-24 DIAGNOSIS — Z20.2 POSSIBLE EXPOSURE TO STD: ICD-10-CM

## 2021-10-24 DIAGNOSIS — A60.04 HERPES SIMPLEX VIRUS (HSV) INFECTION OF VAGINA: ICD-10-CM

## 2021-10-24 LAB
APPEARANCE UR: CLEAR
BILIRUB UR STRIP-MCNC: NEGATIVE MG/DL
COLOR UR AUTO: YELLOW
GLUCOSE UR STRIP.AUTO-MCNC: NEGATIVE MG/DL
INT CON NEG: NORMAL
INT CON POS: NORMAL
KETONES UR STRIP.AUTO-MCNC: NEGATIVE MG/DL
LEUKOCYTE ESTERASE UR QL STRIP.AUTO: NEGATIVE
NITRITE UR QL STRIP.AUTO: NEGATIVE
PH UR STRIP.AUTO: 7 [PH] (ref 5–8)
POC URINE PREGNANCY TEST: NEGATIVE
PROT UR QL STRIP: NEGATIVE MG/DL
RBC UR QL AUTO: NEGATIVE
SP GR UR STRIP.AUTO: 1.02
UROBILINOGEN UR STRIP-MCNC: 0.2 MG/DL

## 2021-10-24 PROCEDURE — 87591 N.GONORRHOEAE DNA AMP PROB: CPT

## 2021-10-24 PROCEDURE — 87491 CHLMYD TRACH DNA AMP PROBE: CPT

## 2021-10-24 PROCEDURE — 81025 URINE PREGNANCY TEST: CPT | Performed by: STUDENT IN AN ORGANIZED HEALTH CARE EDUCATION/TRAINING PROGRAM

## 2021-10-24 PROCEDURE — 81002 URINALYSIS NONAUTO W/O SCOPE: CPT | Performed by: STUDENT IN AN ORGANIZED HEALTH CARE EDUCATION/TRAINING PROGRAM

## 2021-10-24 PROCEDURE — 99214 OFFICE O/P EST MOD 30 MIN: CPT | Performed by: STUDENT IN AN ORGANIZED HEALTH CARE EDUCATION/TRAINING PROGRAM

## 2021-10-24 RX ORDER — VALACYCLOVIR HYDROCHLORIDE 1 G/1
1000 TABLET, FILM COATED ORAL 2 TIMES DAILY
Qty: 20 TABLET | Refills: 0 | Status: SHIPPED | OUTPATIENT
Start: 2021-10-24

## 2021-10-24 NOTE — PROGRESS NOTES
"Subjective:   CHIEF COMPLAINT  Chief Complaint   Patient presents with   • Exposure to STD     swollen groin area,chills,x4 days.       HPI  Brannon Williamson is a 35 y.o. female who presents with chief complaints for possible HSV infection.  The patient says she is sexually active and her current partner does have HSV.  The patient reports a couple days ago she developed left inguinal lymphadenopathy, chills and then yesterday she developed a \"pimple\" along her vagina which she lanced and was able to express very minimal drainage.  Patient says she is not experiencing any dysuria or hematuria.  No pain with intercourse.  No vaginal discharge or odor.  No history of STIs.  She tried taking her boyfriends acyclovir yesterday.    REVIEW OF SYSTEMS  General: no fever or chills  GI: no nausea or vomiting  See HPI for further details.    PAST MEDICAL HISTORY  Patient Active Problem List    Diagnosis Date Noted   • Positive QuantiFERON-TB Gold test 07/01/2021   • Hypokalemia 06/05/2017       SURGICAL HISTORY   has a past surgical history that includes endoscopy; tubal coagulation laparoscopic bilateral (Bilateral, 10/19/2016); and intra uterine device removal (10/19/2016).    ALLERGIES  Allergies   Allergen Reactions   • Crab Anaphylaxis       CURRENT MEDICATIONS  Home Medications     Reviewed by Ora Garcia, Student (Medical Assistant) on 10/24/21 at 1300  Med List Status: <None>   Medication Last Dose Status        Patient Saturnino Taking any Medications                       SOCIAL HISTORY  Social History     Tobacco Use   • Smoking status: Never Smoker   • Smokeless tobacco: Never Used   Vaping Use   • Vaping Use: Never used   Substance and Sexual Activity   • Alcohol use: Yes     Alcohol/week: 0.0 oz     Comment: Occ   • Drug use: No   • Sexual activity: Not on file       FAMILY HISTORY  Family History   Problem Relation Age of Onset   • Other Mother         auto-immune   • Psoriasis Father    • No Known " Problems Sister    • Heart Disease Maternal Grandfather    • Heart Disease Paternal Grandfather    • No Known Problems Daughter    • No Known Problems Daughter           Objective:   PHYSICAL EXAM  VITAL SIGNS: /78 (BP Location: Left arm, Patient Position: Sitting, BP Cuff Size: Adult)   Pulse 70   Temp 37.1 °C (98.7 °F) (Temporal)   Resp 18   Ht 1.524 m (5')   Wt 54.4 kg (120 lb)   SpO2 99%   BMI 23.44 kg/m²     Gen: no acute distress, normal voice  Skin: dry, intact, moist mucosal membranes  Lungs: CTAB w/ symmetric expansion  CV: RRR w/o murmurs or clicks   Psych: normal affect, normal judgement, alert, awake  Genitalia:  Isolated erythematous papule on left labia minora with localized TTP.  No additional gross lesions.  No vaginal discharge.  There was also left inguinal lymphadenopathy.  Female chaperone was present during the examination.    UA: No blood or nitrates or leukocytes.  hCG: Negative    Assessment/Plan:     1. Possible exposure to STD  Chlamydia/GC PCR Urine Or Swab    POCT PREGNANCY    POCT Urinalysis   2. 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  There was no vesicular lesion so unable to swab however explained to patient that this is typically a clinical diagnosis does not need confirmation by testing.  Patient's boyfriend does have HSV.  -Ordered Valtrex  -Ordered GC/CT  -Patient follow-up with OB/GYN for continued management    Differential diagnosis, natural history, supportive care, and indications for immediate follow-up discussed. All questions answered. Patient agrees with the plan of care.    Follow-up as needed if symptoms worsen or fail to improve to PCP, Urgent care or Emergency Room.    Please note that this dictation was created using voice recognition software. I have made a reasonable attempt to correct obvious errors, but I expect that there are errors of grammar and  possibly content that I did not discover before finalizing the note.

## 2021-10-26 LAB
C TRACH DNA SPEC QL NAA+PROBE: NEGATIVE
N GONORRHOEA DNA SPEC QL NAA+PROBE: NEGATIVE
SPECIMEN SOURCE: NORMAL

## 2023-12-09 ENCOUNTER — APPOINTMENT (OUTPATIENT)
Dept: URGENT CARE | Facility: PHYSICIAN GROUP | Age: 38
End: 2023-12-09